# Patient Record
Sex: FEMALE | Race: WHITE
[De-identification: names, ages, dates, MRNs, and addresses within clinical notes are randomized per-mention and may not be internally consistent; named-entity substitution may affect disease eponyms.]

---

## 2018-08-07 ENCOUNTER — HOSPITAL ENCOUNTER (OUTPATIENT)
Dept: HOSPITAL 35 - CAT | Age: 63
End: 2018-08-07
Attending: RADIOLOGY
Payer: COMMERCIAL

## 2018-08-07 DIAGNOSIS — I71.2: ICD-10-CM

## 2018-08-07 DIAGNOSIS — I71.4: Primary | ICD-10-CM

## 2018-08-07 DIAGNOSIS — J44.9: ICD-10-CM

## 2018-08-07 LAB
BUN SERPL-MCNC: 12 MG/DL (ref 7–18)
CREAT SERPL-MCNC: 0.9 MG/DL (ref 0.6–1)

## 2019-08-20 ENCOUNTER — HOSPITAL ENCOUNTER (OUTPATIENT)
Dept: HOSPITAL 61 - PCVCIMAG | Age: 64
Discharge: HOME | End: 2019-08-20
Attending: RADIOLOGY
Payer: COMMERCIAL

## 2019-08-20 DIAGNOSIS — I25.10: ICD-10-CM

## 2019-08-20 DIAGNOSIS — I71.4: Primary | ICD-10-CM

## 2019-08-20 DIAGNOSIS — Z79.82: ICD-10-CM

## 2019-08-20 DIAGNOSIS — Z79.899: ICD-10-CM

## 2019-08-20 DIAGNOSIS — I10: ICD-10-CM

## 2019-08-20 DIAGNOSIS — J44.1: ICD-10-CM

## 2019-08-20 DIAGNOSIS — E03.9: ICD-10-CM

## 2019-08-20 DIAGNOSIS — Z88.8: ICD-10-CM

## 2019-08-20 DIAGNOSIS — Z87.891: ICD-10-CM

## 2019-08-20 DIAGNOSIS — I71.2: ICD-10-CM

## 2019-08-20 DIAGNOSIS — I25.2: ICD-10-CM

## 2019-08-20 DIAGNOSIS — K21.9: ICD-10-CM

## 2019-08-20 DIAGNOSIS — E78.00: ICD-10-CM

## 2019-08-20 PROCEDURE — 93978 VASCULAR STUDY: CPT

## 2019-08-20 NOTE — PCVCIMAG
EXAM: AORTOILIAC DUPLEX



INDICATION: Abdominal aortic aneurysm. 



FINDINGS: 



AORTA: Suprarenal aorta measures maximum diameter of 4.2 cm. There is

a fusiform infrarenal aortic aneurysm. The infrarenal aorta measures

maximum diameter of 2.9 x 3.8 cm. No aortic stenosis.



RIGHT COMMON ILIAC ARTERY: Maximum diameter is 1.2 cm. No significant

stenosis.



RIGHT EXTERNAL ILIAC ARTERY:  No significant stenosis.



LEFT COMMON ILIAC ARTERY: Maximum diameter is 1.0 cm.  No significant

stenosis.



LEFT EXTERNAL ILIAC ARTERY:  No significant stenosis.



IMPRESSION: 

4.2 cm suprarenal abdominal aortic aneurysm.

3.8 cm infrarenal abdominal aortic aneurysm compares to 3.5 cm on July 2017 study.





LOC:[id number]

## 2020-06-04 ENCOUNTER — HOSPITAL ENCOUNTER (OUTPATIENT)
Dept: HOSPITAL 35 - SJCVCIMAG | Age: 65
End: 2020-06-04
Payer: COMMERCIAL

## 2020-06-04 ENCOUNTER — HOSPITAL ENCOUNTER (OUTPATIENT)
Dept: HOSPITAL 35 - CAT | Age: 65
End: 2020-06-04
Attending: RADIOLOGY
Payer: COMMERCIAL

## 2020-06-04 DIAGNOSIS — J44.9: ICD-10-CM

## 2020-06-04 DIAGNOSIS — I10: ICD-10-CM

## 2020-06-04 DIAGNOSIS — I73.9: ICD-10-CM

## 2020-06-04 DIAGNOSIS — I71.4: ICD-10-CM

## 2020-06-04 DIAGNOSIS — E03.9: ICD-10-CM

## 2020-06-04 DIAGNOSIS — Z79.899: ICD-10-CM

## 2020-06-04 DIAGNOSIS — I25.10: ICD-10-CM

## 2020-06-04 DIAGNOSIS — Z79.82: ICD-10-CM

## 2020-06-04 DIAGNOSIS — Z87.891: ICD-10-CM

## 2020-06-04 DIAGNOSIS — I71.2: ICD-10-CM

## 2020-06-04 DIAGNOSIS — Z88.8: ICD-10-CM

## 2020-06-04 DIAGNOSIS — R91.1: ICD-10-CM

## 2020-06-04 DIAGNOSIS — Z88.5: ICD-10-CM

## 2020-06-04 DIAGNOSIS — Z82.49: ICD-10-CM

## 2020-06-04 DIAGNOSIS — K21.9: ICD-10-CM

## 2020-06-04 DIAGNOSIS — I25.2: ICD-10-CM

## 2020-06-04 DIAGNOSIS — I65.23: Primary | ICD-10-CM

## 2020-06-04 DIAGNOSIS — E78.00: ICD-10-CM

## 2020-06-04 DIAGNOSIS — Z88.0: ICD-10-CM

## 2020-06-04 DIAGNOSIS — Z01.818: Primary | ICD-10-CM

## 2020-06-04 LAB
BUN SERPL-MCNC: 14 MG/DL (ref 7–18)
CREAT SERPL-MCNC: 1 MG/DL (ref 0.6–1)

## 2020-07-15 ENCOUNTER — HOSPITAL ENCOUNTER (OUTPATIENT)
Dept: HOSPITAL 35 - SJCVCIMAG | Age: 65
End: 2020-07-15
Attending: INTERNAL MEDICINE
Payer: COMMERCIAL

## 2020-07-15 DIAGNOSIS — I10: ICD-10-CM

## 2020-07-15 DIAGNOSIS — I25.10: Primary | ICD-10-CM

## 2020-07-15 DIAGNOSIS — R06.00: ICD-10-CM

## 2020-07-15 DIAGNOSIS — J44.9: ICD-10-CM

## 2020-07-15 DIAGNOSIS — Z79.899: ICD-10-CM

## 2020-07-15 DIAGNOSIS — Z87.891: ICD-10-CM

## 2021-03-12 ENCOUNTER — HOSPITAL ENCOUNTER (INPATIENT)
Dept: HOSPITAL 35 - 2N | Age: 66
LOS: 2 days | Discharge: HOME | DRG: 303 | End: 2021-03-14
Attending: INTERNAL MEDICINE | Admitting: INTERNAL MEDICINE
Payer: COMMERCIAL

## 2021-03-12 VITALS — BODY MASS INDEX: 37.99 KG/M2 | HEIGHT: 65 IN | WEIGHT: 228 LBS

## 2021-03-12 DIAGNOSIS — K21.9: ICD-10-CM

## 2021-03-12 DIAGNOSIS — F41.9: ICD-10-CM

## 2021-03-12 DIAGNOSIS — J44.9: ICD-10-CM

## 2021-03-12 DIAGNOSIS — I25.110: Primary | ICD-10-CM

## 2021-03-12 DIAGNOSIS — Z87.891: ICD-10-CM

## 2021-03-12 DIAGNOSIS — Z88.8: ICD-10-CM

## 2021-03-12 DIAGNOSIS — Z91.09: ICD-10-CM

## 2021-03-12 DIAGNOSIS — Z90.49: ICD-10-CM

## 2021-03-12 DIAGNOSIS — E03.9: ICD-10-CM

## 2021-03-12 DIAGNOSIS — Z88.5: ICD-10-CM

## 2021-03-12 DIAGNOSIS — E66.9: ICD-10-CM

## 2021-03-12 DIAGNOSIS — I10: ICD-10-CM

## 2021-03-12 DIAGNOSIS — I71.4: ICD-10-CM

## 2021-03-12 DIAGNOSIS — Z98.891: ICD-10-CM

## 2021-03-12 DIAGNOSIS — Z79.82: ICD-10-CM

## 2021-03-12 DIAGNOSIS — E78.00: ICD-10-CM

## 2021-03-12 DIAGNOSIS — Z95.5: ICD-10-CM

## 2021-03-12 DIAGNOSIS — E78.5: ICD-10-CM

## 2021-03-12 DIAGNOSIS — I16.0: ICD-10-CM

## 2021-03-12 DIAGNOSIS — Z88.2: ICD-10-CM

## 2021-03-12 DIAGNOSIS — Z79.899: ICD-10-CM

## 2021-03-12 DIAGNOSIS — I25.2: ICD-10-CM

## 2021-03-12 PROCEDURE — 10797: CPT

## 2021-03-12 NOTE — TEE
[facility]
[address]
[place]   [address]                   TRANSESOPHAGEAL ECHOCARDIOGRAM
_______________________________________________________________________________
 
Name:       CESAR PACKER           Room #:         215-P       DIS IN  
M.R.#:      [id number]                       Account #:      [id number]  
Admission:  21    Attend Phys:    Laci Kimball MD      
Discharge:  21    Date of Birth:  [birth year]  
                                                          Report #: [id number]
                                                                    [id number]
_______________________________________________________________________________
THIS REPORT FOR:  
 
cc:  FAM - Family physician unknown
     FAM - Family physician unknown
     Mauricio Abad MD [facility]                                        
                                                                       ~
 
--------------- APPROVED REPORT --------------
 
 
Study performed:  2021 09:31:04
 
EXAM: Comprehensive 2D, Doppler, and color-flow 
Echocardiogram 
Patient Location: Bedside   
Room #:  215     Status:  on-call
 
      BSA:         2.09
HR: 58 bpm BP:          157/79 mmHg 
Rhythm: NSR     
 
Other Information 
Study Quality: 
Adequate
 
Indications
COPD
CAD
Hypertension/HDD
HLD
 
2D Dimensions
RVDd:  26.07 mm  
IVSd:  15.35 (7-11mm) LVOT Diam:  20.21 (18-24mm) 
LVDd:  50.14 mm  
PWd:  14.95 (7-11mm) Ascending Ao:  39.82 (22-36mm)
LVDs:  36.26 (25-40mm) 
Aortic Root:  34.95 mm IVC:  12.00 mm
 
Volumes
Left Atrial Volume (Systole) 
Single Plane 4CH:  109.79 mL Single Plane 2CH:  90.85 mL
    LA ESV Index:  56.00 mL/m2
 
Aortic Valve
AoV Peak Ba.:  1.38 m/s 
 
 
[facility]
[address]
[place]  [address]
Phone:  [phone]                    TRANSESOPHAGEAL ECHOCARDIOGRAM
_______________________________________________________________________________
 
Name:            BIRDIECESAR L           Room #:        215-P       [facility] [place]
..#:           [id number]          Account #:     [id number]  
Admission:       21         Attend Phys:   Laci Kimball MD  
Discharge:    21      Date of Birth: [birth year]  
                         Report #:      [id number]
        [id number]
_______________________________________________________________________________
AO Peak Gr.:  7.66 mmHg  LVOT Max P.36 mmHg
    LVOT Max V:  1.04 m/s
MAGDALENA Vmax: 2.42 cm2  
 
Mitral Valve
    E/A Ratio:  0.6
    MV Decel. Time:  230.04 ms
MV E Max Ba.:  0.64 m/s 
MV A Ba.:  1.12 m/s  
MV PHT:  66.71 ms  
IVRT:  183.39 ms  
 
Pulmonary Valve
PV Peak Ba.:  0.88 m/s PV Peak Gr.:  3.12 mmHg
 
Pulmonary Vein
P Vein S:    0.45 m/s P Vein A:  0.27 m/s
P Vein D:   0.28 m/s P Vein A Dur.:  155.7 msec
P Vein S/D Ratio:  1.61 
 
Tricuspid Valve
 RAP Estimate:  5.00 mmHg
 
Left Ventricle
The left ventricle is normal size. There is normal LV segmental wall 
motion. Moderate concentric left ventricular hypertrophy. The left 
ventricular systolic function is normal. The left ventricular 
ejection fraction is within the normal range. LVEF is 55-60%. Mild 
diastolic dysfunction
 
Right Ventricle
The right ventricle is normal size. The right ventricular systolic 
function is normal.
 
Atria
Left atrium is moderately dilated. The right atrium size is 
normal.
 
Aortic Valve
The aortic valve is normal in structure. Trace aortic regurgitation. 
There is no aortic valvular stenosis.
 
Mitral Valve
Mild mitral annular calcification Trace mitral regurgitation. No 
evidence of mitral valve stenosis.
 
 
 
[facility]
[address]
[place]  [address]
Phone:  [phone]                    TRANSESOPHAGEAL ECHOCARDIOGRAM
_______________________________________________________________________________
 
Name:            CESAR PACKER           Room #:        215-P       [facility] [place]
.R.#:           [id number]          Account #:     [id number]  
Admission:       21         Attend Phys:   Laci Kimball MD  
Discharge:    21      Date of Birth: [birth year]  
                         Report #:      [id number]
        [id number]
_______________________________________________________________________________
Tricuspid Valve
The tricuspid valve is normal in structure. Trace tricuspid 
regurgitation. Unable to assess PA pressure.
 
Pulmonic Valve
The pulmonary valve is normal in structure. There is no pulmonic 
valvular regurgitation.
 
Great Vessels
The aortic root is normal in size. The ascending aorta is mildly 
dilated (4.0cm). IVC is normal in size and collapses >50% with 
inspiration.
 
Pericardium
There is no pericardial effusion.
 
<Conclusion>
The left ventricular systolic function is normal.
There is normal LV segmental wall motion.
LVEF is 55-60%.
Mild diastolic dysfunction
Left atrium is moderately dilated.
The aortic valve is normal in structure. Trace aortic regurgitation, 
no stenosis.
Mild mitral annular calcification. Trace mitral regurgitation.
Unable to assess pulmonary artery pressure.
The ascending aorta is mildly dilated (4.0cm).
There is no pericardial effusion.
 
 
 
 
 
 
 
 
 
 
 
 
 
 
 
 
                         
   By:                               
                   
D: 21 1042                           _____________________________________
T: 21 1042                           Mauricio Abad MD, FACC     /INF

## 2021-03-13 VITALS — SYSTOLIC BLOOD PRESSURE: 137 MMHG | DIASTOLIC BLOOD PRESSURE: 58 MMHG

## 2021-03-13 VITALS — DIASTOLIC BLOOD PRESSURE: 69 MMHG | SYSTOLIC BLOOD PRESSURE: 144 MMHG

## 2021-03-13 VITALS — DIASTOLIC BLOOD PRESSURE: 90 MMHG | SYSTOLIC BLOOD PRESSURE: 130 MMHG

## 2021-03-13 VITALS — DIASTOLIC BLOOD PRESSURE: 79 MMHG | SYSTOLIC BLOOD PRESSURE: 157 MMHG

## 2021-03-13 VITALS — DIASTOLIC BLOOD PRESSURE: 64 MMHG | SYSTOLIC BLOOD PRESSURE: 142 MMHG

## 2021-03-13 LAB
ANION GAP SERPL CALC-SCNC: 10 MMOL/L (ref 7–16)
BUN SERPL-MCNC: 12 MG/DL (ref 7–18)
CALCIUM SERPL-MCNC: 8.8 MG/DL (ref 8.5–10.1)
CHLORIDE SERPL-SCNC: 101 MMOL/L (ref 98–107)
CHOLEST SERPL-MCNC: 289 MG/DL (ref ?–200)
CO2 SERPL-SCNC: 30 MMOL/L (ref 21–32)
CREAT SERPL-MCNC: 0.9 MG/DL (ref 0.6–1)
ERYTHROCYTE [DISTWIDTH] IN BLOOD BY AUTOMATED COUNT: 15.4 % (ref 10.5–14.5)
GLUCOSE SERPL-MCNC: 142 MG/DL (ref 74–106)
HCT VFR BLD CALC: 36.4 % (ref 37–47)
HDLC SERPL-MCNC: 30 MG/DL (ref 40–?)
HGB BLD-MCNC: 11.9 GM/DL (ref 12–15)
LDLC SERPL-MCNC: 187 MG/DL (ref ?–100)
MCH RBC QN AUTO: 26.9 PG (ref 26–34)
MCHC RBC AUTO-ENTMCNC: 32.7 G/DL (ref 28–37)
MCV RBC: 82.1 FL (ref 80–100)
PLATELET # BLD: 218 THOU/UL (ref 150–400)
POTASSIUM SERPL-SCNC: 3.4 MMOL/L (ref 3.5–5.1)
RBC # BLD AUTO: 4.43 MIL/UL (ref 4.2–5)
SODIUM SERPL-SCNC: 141 MMOL/L (ref 136–145)
TC:HDL: 9.6 RATIO
TRIGL SERPL-MCNC: 364 MG/DL (ref ?–150)
VLDLC SERPL CALC-MCNC: 73 MG/DL (ref ?–40)
WBC # BLD AUTO: 6.7 THOU/UL (ref 4–11)

## 2021-03-13 NOTE — NUR
DIRECT ADMIT FROM [facility] ARRIVED AT 0138 VIA EMS TRANSPORT; AOX4/STEADY
GAIT/SBA TO TOILET; C/O LOW-GRADE CHEST PAIN WITH NITRO PASTE TO RT UPPER
CHEST; VSS; ADMISSION COMPLETED; SCDS IN PLACE; CONSULTS CALLED; WILL CONTINUE
TO MONITOR AND FOLLOW POC.

## 2021-03-13 NOTE — NUR
ASSUMED CARE OF PT AT SHIFT CHANGE. ASSESSMENTS AS CHARTED. MEDS GIVEN PER
MAR. PT A&XO4. RATED CHEST PAIN 2/10, DECLINE PAIN MEDS. SHOULDER XRAY NEG FOR
FX/DISLOCATION. PLAN FOR DISCHARGE TOMORROW. WILL CONTINUE TO MONITOR FOR
CHANGES AND FOLLOW POC.

## 2021-03-14 VITALS — DIASTOLIC BLOOD PRESSURE: 89 MMHG | SYSTOLIC BLOOD PRESSURE: 146 MMHG

## 2021-03-14 VITALS — SYSTOLIC BLOOD PRESSURE: 151 MMHG | DIASTOLIC BLOOD PRESSURE: 60 MMHG

## 2021-03-14 VITALS — SYSTOLIC BLOOD PRESSURE: 151 MMHG | DIASTOLIC BLOOD PRESSURE: 58 MMHG

## 2021-03-14 VITALS — DIASTOLIC BLOOD PRESSURE: 118 MMHG | SYSTOLIC BLOOD PRESSURE: 180 MMHG

## 2021-03-14 LAB
ANION GAP SERPL CALC-SCNC: 11 MMOL/L (ref 7–16)
BUN SERPL-MCNC: 13 MG/DL (ref 7–18)
CALCIUM SERPL-MCNC: 9 MG/DL (ref 8.5–10.1)
CHLORIDE SERPL-SCNC: 100 MMOL/L (ref 98–107)
CO2 SERPL-SCNC: 28 MMOL/L (ref 21–32)
CREAT SERPL-MCNC: 1 MG/DL (ref 0.6–1)
EST. AVERAGE GLUCOSE BLD GHB EST-MCNC: 128 MG/DL
GLUCOSE SERPL-MCNC: 102 MG/DL (ref 74–106)
GLYCOHEMOGLOBIN (HGB A1C): 6.1 % (ref 4.8–5.6)
MAGNESIUM SERPL-MCNC: 2.1 MG/DL (ref 1.8–2.4)
POTASSIUM SERPL-SCNC: 3.2 MMOL/L (ref 3.5–5.1)
SODIUM SERPL-SCNC: 139 MMOL/L (ref 136–145)
TROPONIN I SERPL-MCNC: <0.06 NG/ML (ref ?–0.06)

## 2021-03-14 NOTE — NUR
ASSUMED CARE OF PT AT SHIFT CHANGE. ASSESSMENT AS CHARTED. MEDS GIVEN PER MAR.
PT A&OX4, NO C/O PAIN, SOA OR DISTRESS. DISCHARGE ORDERS AND INSTRUCTIONS
COMPLETE. TELE AND IV DC'D. PT WALKED TO ER ENTRANCE BY NURSING STAFF TO SON
IN WAITING CAR.

## 2021-03-14 NOTE — NUR
ASSESSMENTS AS CHARTED, MEDS CHARTED AS GIVEN. PATIENT C/O HEADACHE AND GAS
PAINS IN UPPER CHEST. PATIENT RELEASED ONE TIME DOSE OF MYLANTA AND TYLENOL.
PATIENT UP AT COREY IN ROOM. SR/SB ON TELEMETRY. PLAN OF CARE IS TO DISCHARGE
TODAY.

## 2021-03-16 NOTE — 2DMMODE
[facility]
[address]
[place]   [address]                   2 D/M-MODE ECHOCARDIOGRAM     
_______________________________________________________________________________
 
Name:       CESAR PACKER ISAI           Room #:         215-P       [facility] [place]  
.R.#:      [id number]                       Account #:      [id number]  
Admission:  21    Attend Phys:    Laci Kimball MD      
Discharge:  21    Date of Birth:  [birth year]  
                                                          Report #: [id number]
                                                                                
_______________________________________________________________________________
THIS REPORT FOR:  
 
cc:  FAM - Family physician unknown
     FAM - Family physician unknown
     Mauricio Abad MD [facility]                                        
     Deuce Estrada                                             ~
Patient ID : [id number]
Sex/Age : F/65Y
Height/Weight : 165.1cm/103.4kg
Patient Name : CESAR PACKER
Study Date : 2021
BSA : 2.09?
Requesting Name : ECHO STANDARD W/O CONTRAST
YOB: 1955
Request Doctor : DEUCE ESTRADA
Department : CARD
 
 
--< Approved Report >----------------------------
 
Study performed:  2021 09:31:04
 
EXAM: Comprehensive 2D, Doppler, and color-flow Echocardiogram 
Patient Location: Bedside   
Room #:  215     Status:  on-call
 
      BSA:         2.09
HR: 58 bpm BP:          157/79 mmHg 
Rhythm: NSR     
 
Other Information 
Study Quality: Adequate
 
Indications
COPD
CAD
Hypertension/HDD
HLD
 
2D Dimensions
RVDd:  26.07 mm  
IVSd:  15.35 (7~11mm) LVOT Diam:  20.21 (18~24mm) 
LVDd:  50.14 mm  
PWd:  14.95 (7~11mm) Ascending Ao:  39.82 (22~36mm)
LVDs:  36.26 (25~40mm) 
Aortic Root:  34.95 mm IVC:  12.00 mm
 
 
[facility]
[address]
[place]  [address]
Phone:  [phone] 2 D/M-MODE ECHOCARDIOGRAM     
_______________________________________________________________________________
 
Name:            CESAR PACKER           Room #:        215-P       [facility].#:           [id number]          Account #:     [id number]  
Admission:       21         Attend Phys:   Laci Kimball MD  
Discharge:    21      Date of Birth: [birth year]  
                         Report #:      [id number]
                      
_______________________________________________________________________________
 
Volumes
Left Atrial Volume (Systole) 
Single Plane 4CH:  109.79 mL Single Plane 2CH:  90.85 mL
    LA ESV Index:  56.00 mL/m2
 
Aortic Valve
AoV Peak Ba.:  1.38 m/s 
AO Peak Gr.:  7.66 mmHg  LVOT Max P.36 mmHg
    LVOT Max V:  1.04 m/s
MAGDALENA Vmax: 2.42 cm2  
 
Mitral Valve
    E/A Ratio:  0.6
    MV Decel. Time:  230.04 ms
MV E Max Ba.:  0.64 m/s 
MV A Ba.:  1.12 m/s  
MV PHT:  66.71 ms  
IVRT:  183.39 ms  
 
Pulmonary Valve
PV Peak Ba.:  0.88 m/s PV Peak Gr.:  3.12 mmHg
 
Pulmonary Vein
P Vein S:    0.45 m/s P Vein A:  0.27 m/s
P Vein D:   0.28 m/s P Vein A Dur.:  155.7 msec
P Vein S/D Ratio:  1.61 
 
Tricuspid Valve
 RAP Estimate:  5.00 mmHg
 
Left Ventricle
The left ventricle is normal size. There is normal LV segmental wall motion.
Moderate concentric left ventricular hypertrophy. The left ventricular systolic
function is normal. The left ventricular ejection fraction is within the normal
range. LVEF is 55-60%. Mild diastolic dysfunction
 
Right Ventricle
The right ventricle is normal size. The right ventricular systolic function is
normal.
 
Atria
Left atrium is moderately dilated. The right atrium size is normal.
 
Aortic Valve
The aortic valve is normal in structure. Trace aortic regurgitation. There is no
 
 
[facility]
[address] [place] [address]
Phone:  [phone]                    2 D/M-MODE ECHOCARDIOGRAM     
_______________________________________________________________________________
 
Name:            CESAR PACKER           Room #:        215-P       [facility] [place]
[facility].#:           [id number]          Account #:     [id number]  
Admission:       21         Attend Phys:   Laci Kimball MD  
Discharge:    21      Date of Birth: [birth year]  
                         Report #:      [id number]
                      
_______________________________________________________________________________
aortic valvular stenosis.
 
Mitral Valve
Mild mitral annular calcification Trace mitral regurgitation. No evidence of
mitral valve stenosis.
 
Tricuspid Valve
The tricuspid valve is normal in structure. Trace tricuspid regurgitation.
Unable to assess PA pressure.
 
Pulmonic Valve
The pulmonary valve is normal in structure. There is no pulmonic valvular
regurgitation.
 
Great Vessels
The aortic root is normal in size. The ascending aorta is mildly dilated
(4.0cm). IVC is normal in size and collapses >50% with inspiration.
 
Pericardium
There is no pericardial effusion.
 
<Conclusion>
The left ventricular systolic function is normal.
There is normal LV segmental wall motion.
LVEF is 55-60%.
Mild diastolic dysfunction
Left atrium is moderately dilated.
The aortic valve is normal in structure. Trace aortic regurgitation, no
stenosis.
Mild mitral annular calcification. Trace mitral regurgitation.
Unable to assess pulmonary artery pressure.
The ascending aorta is mildly dilated (4.0cm).
There is no pericardial effusion.
 
-------------------------------------------------
Electronically Approved : 
03/15/2021 14:15:45
 
 
 
 
 
 
 
                         
   By:                               
                   
D: 21 1042                           _____________________________________
T: 21 1403                           Mauricio Abad MD, FACC     /